# Patient Record
Sex: FEMALE | Race: WHITE | NOT HISPANIC OR LATINO | URBAN - NONMETROPOLITAN AREA
[De-identification: names, ages, dates, MRNs, and addresses within clinical notes are randomized per-mention and may not be internally consistent; named-entity substitution may affect disease eponyms.]

---

## 2024-04-30 ENCOUNTER — OFFICE (OUTPATIENT)
Dept: URBAN - NONMETROPOLITAN AREA CLINIC 1 | Facility: CLINIC | Age: 69
End: 2024-04-30
Payer: MEDICAID

## 2024-04-30 VITALS
WEIGHT: 196 LBS | SYSTOLIC BLOOD PRESSURE: 108 MMHG | DIASTOLIC BLOOD PRESSURE: 73 MMHG | HEART RATE: 56 BPM | HEIGHT: 62 IN

## 2024-04-30 DIAGNOSIS — Z79.01 LONG TERM (CURRENT) USE OF ANTICOAGULANTS: ICD-10-CM

## 2024-04-30 DIAGNOSIS — I48.91 UNSPECIFIED ATRIAL FIBRILLATION: ICD-10-CM

## 2024-04-30 DIAGNOSIS — I10 ESSENTIAL (PRIMARY) HYPERTENSION: ICD-10-CM

## 2024-04-30 DIAGNOSIS — I25.10 ATHEROSCLEROTIC HEART DISEASE OF NATIVE CORONARY ARTERY WITH: ICD-10-CM

## 2024-04-30 DIAGNOSIS — R13.10 DYSPHAGIA, UNSPECIFIED: ICD-10-CM

## 2024-04-30 DIAGNOSIS — M19.90 UNSPECIFIED OSTEOARTHRITIS, UNSPECIFIED SITE: ICD-10-CM

## 2024-04-30 DIAGNOSIS — K21.9 GASTRO-ESOPHAGEAL REFLUX DISEASE WITHOUT ESOPHAGITIS: ICD-10-CM

## 2024-04-30 PROCEDURE — 99204 OFFICE O/P NEW MOD 45 MIN: CPT | Performed by: NURSE PRACTITIONER

## 2024-04-30 NOTE — SERVICEHPINOTES
In today with complaints of dysphagia, for the past couple of years.  She has a lot of acid reflux, despite using omeprazole 40 mg b.i.d..  She feels that her food gets hung up when she is eating.  She points to her throat as she describes her symptoms, and says that everything gets stuck there.  She remembers having an EGD with esophageal dilation about 8 years ago and it helped a lot.  She would like to have it done again now.  Her chronic illnesses include atrial fibrillation, coronary artery disease, GERD, osteoarthritis, anxiety / depression.  She  takes Eliquis for AFib and will be asked to hold it 2 days prior to her procedure.
br
br   EGD/ colonoscopy 5/29/18 by Dr. Carey-
br EGD Findings:brEsophagus:  Z-line = 35 cm. The esophageal mucosa is normal with no esophagitis, Robison's esophagus, varices, distal esophageal stricture, or mass.       An esophageal ring was visualized in the area of the upper esophageal sphincter therefore, a guidewire was placed through the endoscope into the antrum of the stomach and the endoscope was removed. A 54 [place] [employer] Savary dilator was passed over the guidewire to 60 centimeters and then removed. Repeat endoscopy confirmed dilation of the esophageal ring with no complication.brGE Junction:  Normal except a 2 centimeter hiatal hernia brStomach:  Normal with no ulcer, mass, or erosion.brPylorus:  Normal and patentbrDuodenum:  Normal with no ulcer, duodenitis, mass, AVM, or stricture.
br
Colon Findings:
brThe cecum is normal. The colonic mucosal vascular pattern, folds, and color are all normal with no polyps, masses, ulcerations, diverticulosis, or colitis. Retroflex exam and anal inspection were unremarkable except for small internal hemorrhoids.  Next colonoscopy 10 years.br